# Patient Record
Sex: FEMALE | Race: WHITE | Employment: OTHER
[De-identification: names, ages, dates, MRNs, and addresses within clinical notes are randomized per-mention and may not be internally consistent; named-entity substitution may affect disease eponyms.]

---

## 2018-03-02 ENCOUNTER — CONSULT (OUTPATIENT)
Age: 78
End: 2018-03-02

## 2018-03-02 DIAGNOSIS — H04.123: ICD-10-CM

## 2018-03-02 DIAGNOSIS — H26.492: ICD-10-CM

## 2018-03-02 DIAGNOSIS — Z96.1: ICD-10-CM

## 2018-03-02 DIAGNOSIS — H26.491: ICD-10-CM

## 2018-03-02 PROCEDURE — 92014 COMPRE OPH EXAM EST PT 1/>: CPT

## 2018-03-05 ASSESSMENT — TONOMETRY
OS_IOP_MMHG: 17
OD_IOP_MMHG: 17

## 2018-03-05 ASSESSMENT — VISUAL ACUITY
OD_CC: J1
OS_SC: J12
OS_CC: J1
OD_BAT: 20/70 W.MR
OD_SC: 20/50
OS_BAT: 20/70 W.MR
OS_SC: 20/60
OD_SC: J12

## 2018-03-22 ENCOUNTER — SURGERY/PROCEDURE (OUTPATIENT)
Dept: URBAN - METROPOLITAN AREA SURGERY 14 | Facility: SURGERY | Age: 78
End: 2018-03-22

## 2018-03-22 DIAGNOSIS — H26.491: ICD-10-CM

## 2018-03-22 DIAGNOSIS — H26.492: ICD-10-CM

## 2020-11-16 NOTE — PATIENT DISCUSSION
Has been on durezol 6 months, no CME present, will Taper durezol TID for 1W, BID for 1W, QDAY for 1W. D/.

## 2022-05-27 NOTE — PATIENT DISCUSSION
short-term awareness of PVD(s) most likely.  Monitor/reassurance given.  FF sheet given today call STAT if new FF.

## 2024-04-28 NOTE — PATIENT DISCUSSION
Retinal tear and detachment warning symptoms reviewed and patient instructed to call immediately if increasing floaters, flashes, or decreasing peripheral vision. show

## 2024-07-12 NOTE — PATIENT DISCUSSION
Chief Complaint   Patient presents with    Other     Discuss possible ADHD, discuss possible sleep study testing    Referral - General     Dietician- possible gluten allergy         SUBJECTIVE     Noé Licea is a 30 y.o.male      Pt has been on Adderall in the past for ADHD. Has not been on it since he was 18-19 years old. He is hoping to get this restarted as it is affecting his day to day. Previously Family Doctor diagnosed ADHD but he has since passed away. Dr Stringer prescribed this for him years ago.      [facility] is managing his wound care. Continues to have drainage. Was going to get him in to a dietician but they could not get him in for a year.     Thinks he is having ticks related to being stressed. He spoke with his neurologist and he is not concerned. He is taking hydroxyzine but does not like taking them, does not feel they work well.     Thinks he has a UTI. Is feeling saurabh poor.     Dr Schwab - Pt's previous provider would contact him directly with questions. Pt would like us to reach out to Dr Schwab if we have any needs    Pt thinks he may have sleep apnea. Friend told him he snores and has pauses in breathing at night.     Review of Systems   Constitutional:  Positive for fatigue and fever. Negative for chills and unexpected weight change.   HENT: Negative.          Snoring   Eyes: Negative.    Respiratory:  Negative for chest tightness and shortness of breath.    Cardiovascular:  Negative for chest pain, palpitations and leg swelling.   Gastrointestinal:  Negative for abdominal pain and blood in stool.   Genitourinary:  Negative for dysuria.   Musculoskeletal:  Negative for joint swelling and myalgias.   Skin:  Positive for wound (stomach). Negative for rash.   Neurological:  Negative for dizziness.   Psychiatric/Behavioral:  The patient is nervous/anxious.    All other systems reviewed and are negative.        OBJECTIVE     /68 (Site: Left Upper Arm, Position: Sitting, Cuff Size: Medium Adult)    Doing well.